# Patient Record
(demographics unavailable — no encounter records)

---

## 2024-12-25 NOTE — HISTORY OF PRESENT ILLNESS
[FreeTextEntry1] : Edin is a 12-year-old male who presents today for evaluation of left wrist injury.  He is here today with his mother.  Two weeks ago, he was playing soccer when the soccer ball hit his hand and forced his wrist into flexion.  He reported feeling wrist pain afterwards and was seen in the emergency department.  An x-ray was negative for acute fracture.  He was placed in a splint and instructed to follow-up with a hand surgeon.  Currently denies any pain in his hand or wrist.  No prior hand injuries or surgeries.  Otherwise healthy.

## 2024-12-25 NOTE — PHYSICAL EXAM
[NI] : Normal [de-identified] : Left hand with normal cascade.  Sensation intact to median, radial, ulnar nerve distributions.  Full digital flexion-extension.  Full, pain-free wrist flexion and extension.  5/5 wrist flexion and extension without pain.  Negative Hurst shift test.  No tenderness at any point along the wrist.  FPL, FDP, FDS, EDC, EPL intact.

## 2024-12-25 NOTE — ASSESSMENT
[FreeTextEntry1] : 12-year-old male with left wrist sprain, now healed  It is likely that he had a mild wrist sprain from the injury.  It is now healed after 2 weeks of splint immobilization.  He does not need to continue wearing the splint anymore.  He is cleared to return to gym and sports without restrictions.  A school note was provided.  Follow-up as needed.

## 2024-12-25 NOTE — DATA REVIEWED
[FreeTextEntry1] : Three-view x-rays of the left hand reviewed from 12/10/2024.  Negative for acute fracture or dislocation.  No foreign body.  Joint spaces well-maintained.